# Patient Record
Sex: MALE
[De-identification: names, ages, dates, MRNs, and addresses within clinical notes are randomized per-mention and may not be internally consistent; named-entity substitution may affect disease eponyms.]

---

## 2019-09-08 ENCOUNTER — HOSPITAL ENCOUNTER (OUTPATIENT)
Dept: HOSPITAL 88 - ER | Age: 64
Setting detail: OBSERVATION
LOS: 1 days | Discharge: HOME | End: 2019-09-09
Attending: INTERNAL MEDICINE | Admitting: INTERNAL MEDICINE
Payer: COMMERCIAL

## 2019-09-08 VITALS — HEIGHT: 66 IN | BODY MASS INDEX: 24.59 KG/M2 | WEIGHT: 153 LBS

## 2019-09-08 VITALS — SYSTOLIC BLOOD PRESSURE: 111 MMHG | DIASTOLIC BLOOD PRESSURE: 64 MMHG

## 2019-09-08 VITALS — DIASTOLIC BLOOD PRESSURE: 64 MMHG | SYSTOLIC BLOOD PRESSURE: 111 MMHG
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
## 2019-09-08 DIAGNOSIS — T63.441A: ICD-10-CM

## 2019-09-08 DIAGNOSIS — T78.2XXA: Primary | ICD-10-CM

## 2019-09-08 LAB
ALBUMIN SERPL-MCNC: 4.2 G/DL (ref 3.5–5)
ALBUMIN/GLOB SERPL: 1.2 {RATIO} (ref 0.8–2)
ALP SERPL-CCNC: 67 IU/L (ref 40–150)
ALT SERPL-CCNC: 18 IU/L (ref 0–55)
ANION GAP SERPL CALC-SCNC: 13.7 MMOL/L (ref 8–16)
BACTERIA URNS QL MICRO: (no result) /HPF
BASOPHILS # BLD AUTO: 0 10*3/UL (ref 0–0.1)
BASOPHILS NFR BLD AUTO: 0.2 % (ref 0–1)
BILIRUB UR QL: NEGATIVE
BUN SERPL-MCNC: 16 MG/DL (ref 7–26)
BUN/CREAT SERPL: 20 (ref 6–25)
CALCIUM SERPL-MCNC: 10.1 MG/DL (ref 8.4–10.2)
CHLORIDE SERPL-SCNC: 102 MMOL/L (ref 98–107)
CK MB SERPL-MCNC: 0.8 NG/ML (ref 0–5)
CK MB SERPL-MCNC: 1.8 NG/ML (ref 0–5)
CK SERPL-CCNC: 116 IU/L (ref 30–200)
CK SERPL-CCNC: 159 IU/L (ref 30–200)
CLARITY UR: CLEAR
CO2 SERPL-SCNC: 27 MMOL/L (ref 22–29)
COLOR UR: YELLOW
DEPRECATED APTT PLAS QN: 29.1 SECONDS (ref 23.8–35.5)
DEPRECATED INR PLAS: 0.92
DEPRECATED NEUTROPHILS # BLD AUTO: 10 10*3/UL (ref 2.1–6.9)
DEPRECATED RBC URNS MANUAL-ACNC: (no result) /HPF (ref 0–5)
EGFRCR SERPLBLD CKD-EPI 2021: > 60 ML/MIN (ref 60–?)
EOSINOPHIL # BLD AUTO: 0 10*3/UL (ref 0–0.4)
EOSINOPHIL NFR BLD AUTO: 0.2 % (ref 0–6)
EPI CELLS URNS QL MICRO: (no result) /LPF
ERYTHROCYTE [DISTWIDTH] IN CORD BLOOD: 12.8 % (ref 11.7–14.4)
GLOBULIN PLAS-MCNC: 3.4 G/DL (ref 2.3–3.5)
GLUCOSE SERPLBLD-MCNC: 105 MG/DL (ref 74–118)
HCT VFR BLD AUTO: 40.6 % (ref 38.2–49.6)
HGB BLD-MCNC: 14.1 G/DL (ref 14–18)
KETONES UR QL STRIP.AUTO: NEGATIVE
LEUKOCYTE ESTERASE UR QL STRIP.AUTO: NEGATIVE
LYMPHOCYTES # BLD: 1.1 10*3/UL (ref 1–3.2)
LYMPHOCYTES NFR BLD AUTO: 8.8 % (ref 18–39.1)
MCH RBC QN AUTO: 29.7 PG (ref 28–32)
MCHC RBC AUTO-ENTMCNC: 34.7 G/DL (ref 31–35)
MCV RBC AUTO: 85.5 FL (ref 81–99)
MONOCYTES # BLD AUTO: 0.9 10*3/UL (ref 0.2–0.8)
MONOCYTES NFR BLD AUTO: 7.2 % (ref 4.4–11.3)
NEUTS SEG NFR BLD AUTO: 83.1 % (ref 38.7–80)
NITRITE UR QL STRIP.AUTO: NEGATIVE
PLATELET # BLD AUTO: 277 X10E3/UL (ref 140–360)
POTASSIUM SERPL-SCNC: 3.7 MMOL/L (ref 3.5–5.1)
PROT UR QL STRIP.AUTO: (no result)
PROTHROMBIN TIME: 12.8 SECONDS (ref 11.9–14.5)
RBC # BLD AUTO: 4.75 X10E6/UL (ref 4.3–5.7)
SODIUM SERPL-SCNC: 139 MMOL/L (ref 136–145)
SP GR UR STRIP: 1.02 (ref 1.01–1.02)
TSH SERPL DL<=0.005 MIU/L-ACNC: 1.39 UIU/ML (ref 0.35–4.94)
UROBILINOGEN UR STRIP-MCNC: 0.2 MG/DL (ref 0.2–1)
WBC #/AREA URNS HPF: >50 /HPF (ref 0–5)

## 2019-09-08 PROCEDURE — 81001 URINALYSIS AUTO W/SCOPE: CPT

## 2019-09-08 PROCEDURE — 82550 ASSAY OF CK (CPK): CPT

## 2019-09-08 PROCEDURE — 87086 URINE CULTURE/COLONY COUNT: CPT

## 2019-09-08 PROCEDURE — 70450 CT HEAD/BRAIN W/O DYE: CPT

## 2019-09-08 PROCEDURE — 71045 X-RAY EXAM CHEST 1 VIEW: CPT

## 2019-09-08 PROCEDURE — 93005 ELECTROCARDIOGRAM TRACING: CPT

## 2019-09-08 PROCEDURE — 85610 PROTHROMBIN TIME: CPT

## 2019-09-08 PROCEDURE — 72125 CT NECK SPINE W/O DYE: CPT

## 2019-09-08 PROCEDURE — 82553 CREATINE MB FRACTION: CPT

## 2019-09-08 PROCEDURE — 84484 ASSAY OF TROPONIN QUANT: CPT

## 2019-09-08 PROCEDURE — 83880 ASSAY OF NATRIURETIC PEPTIDE: CPT

## 2019-09-08 PROCEDURE — 80061 LIPID PANEL: CPT

## 2019-09-08 PROCEDURE — 85730 THROMBOPLASTIN TIME PARTIAL: CPT

## 2019-09-08 PROCEDURE — 99284 EMERGENCY DEPT VISIT MOD MDM: CPT

## 2019-09-08 PROCEDURE — 36415 COLL VENOUS BLD VENIPUNCTURE: CPT

## 2019-09-08 PROCEDURE — 80053 COMPREHEN METABOLIC PANEL: CPT

## 2019-09-08 PROCEDURE — 90714 TD VACC NO PRESV 7 YRS+ IM: CPT

## 2019-09-08 PROCEDURE — 84443 ASSAY THYROID STIM HORMONE: CPT

## 2019-09-08 PROCEDURE — 85025 COMPLETE CBC W/AUTO DIFF WBC: CPT

## 2019-09-08 RX ADMIN — CEFTRIAXONE SCH MLS/HR: 100 INJECTION, POWDER, FOR SOLUTION INTRAVENOUS at 20:30

## 2019-09-08 RX ADMIN — SODIUM CHLORIDE SCH MLS/HR: 9 INJECTION, SOLUTION INTRAVENOUS at 20:30

## 2019-09-08 NOTE — DIAGNOSTIC IMAGING REPORT
EXAMINATION:  CHEST SINGLE (PORTABLE)    



INDICATION: Fall, dizziness. 



COMPARISON:  None

     

FINDINGS:

TUBES and LINES:  None.



LUNGS:  Lungs are well inflated.  There is no evidence of pneumonia or

pulmonary edema.



PLEURA:  No pleural effusion or pneumothorax. 



HEART AND MEDIASTINUM:  The cardiomediastinal silhouette is unremarkable.    



BONES AND SOFT TISSUES:  No acute osseous abnormality. 



UPPER ABDOMEN: No free air under the diaphragm.    



IMPRESSION: 

No acute radiographic abnormality. 



Signed by: Dr. Violeta Reina MD on 9/8/2019 4:34 PM

## 2019-09-08 NOTE — DIAGNOSTIC IMAGING REPORT
Examination: CT CERVICAL SPINE WO CONTRAST



HISTORY:Neck injury after syncope.

COMPARISON:None.

TECHNIQUE: Multidetector helical axial images were obtained without contrast

from the foramen magnum to T1.  Coronal and sagittal reformatted images were

done. Bone and soft tissue windows were evaluated. 

Dose modulation, iterative reconstruction, and/or weight based adjustment of

the mA/kV was utilized to reduce the radiation dose to as low as reasonably

achievable. 



FINDINGS:  

Alignment:Normal alignment and lordosis.

Vertebrae:  Normal height and density. No acute fracture, infection or

neoplasm.

Disc space heights: Normal height.

Caliber of spinal canal: Developmentally normal.



Posterior fossa and craniocervical junction: Foramen magnum patent. No Chiari 1

malformation.



Soft tissues: No abnormality.



Degenerative changes:

No disc bulge/ herniation or foraminal or canal stenosis.



Visualized lung apices:

No abnormalities.



IMPRESSION:

No acute abnormalities.



Signed by: Dr. Mona Pickens M.D. on 9/8/2019 4:38 PM

## 2019-09-08 NOTE — DIAGNOSTIC IMAGING REPORT
Examination: CT BRAIN WO CONTRAST



History:Syncope with fall and head injury.

Comparison studies:None



Technique:

Axial images were obtained from the skull base to the vertex.

Coronal and sagittal images reconstructed from the axial data.

Dose modulation, iterative reconstruction, and/or weight based adjustment of

the mA/kV was utilized to reduce the radiation dose to as low as reasonably

achievable. 

Intravenous contrast: None



Findings:



Scalp: No abnormalities.

Bones: No fractures, blastic or lytic lesions.



Brain sulci: Appropriate for age.

Ventricles: Normal in size and configuration. No hydrocephalus.



Extra-axial space:

No abnormalities.



Parenchyma: 

No abnormal densities. 

No masses, hemorrhage, or acute or chronic cortical based vascular insults..



Sellar/suprasellar region: No abnormalities.

Craniocervical junction: Patent foramen magnum. No Chiari one malformation.



Impression:

 

No intracranial abnormalities.



Signed by: Dr. Mona Pickens M.D. on 9/8/2019 4:33 PM

## 2019-09-08 NOTE — XMS REPORT
Patient Summary Document

                             Created on: 2019



ARNIE BOLIVAR

External Reference #: [id number]

: 1955

Sex: Male



Demographics







                          Address                   [address] [place]  [address]

 

                          Home Phone                [phone]

 

                          Preferred Language        Unknown

 

                          Marital Status            Unknown

 

                          Ad[employer]ist Affiliation     Unknown

 

                          Race                      Unknown

 

                          Ethnic Group              Unknown





Author







                          [facility]                    [facility]

 

                          [facility]

 

                          Address                   Unknown

 

                          Phone                     Unavailable







Care Team Providers







                    Care Team Member Name    Role                Phone

 

                    SWEET, A LAIRD      Unavailable         Unavailable







Problems

This patient has no known problems.



Allergies, Adverse Reactions, Alerts

This patient has no known allergies or adverse reactions.



Medications

This patient has no known medications.



Results







           Test Description    Test Time    Test Comments    Text Results    Atomic Results    Result

 Comments

 

                CT CERVICAL SPINE WO    2019 16:33:00                                                      

                                                    [place] [address]      Patient Name: ARNIE BOLIVAR                        
          MR #: [id number]                     : 1955                  
                Age/Sex: 63/M  Acct #: [id number]                             
Req #: [id number]  Adm Physician:                                               
      Ordered by: ALEENA VEGAS NP                            Report #: [id number]
[id number]        Location: ER                                      Room/Bed:         
           
___________________________________________________________________________________________________
   Procedure: [id number] CT/CT CERVICAL SPINE WO  Exam Date: 19            
               Exam Time: 1549                                              
REPORT STATUS: Signed    Examination: CT CERVICAL SPINE WO CONTRAST      
HISTORY:Neck injury after syncope.   COMPARISON:None.   TECHNIQUE: Multidetector
helical axial images were obtained without contrast   from the foramen magnum to
T1.  Coronal and sagittal reformatted images were   done. Bone and soft tissue 
windows were evaluated.    Dose modulation, iterative reconstruction, and/or 
weight based adjustment of   the mA/kV was utilized to reduce the radiation dose
to as low as reasonably   achievable.       FINDINGS:     Alignment:Normal 
alignment and lordosis.   Vertebrae:  Normal height and density. No acute 
fracture, infection or   neoplasm.   Disc space heights: Normal height.   
Caliber of spinal canal: Developmentally normal.      Posterior fossa and 
craniocervical junction: Foramen magnum patent. No Chiari 1   malformation.     
Soft tissues: No abnormality.      Degenerative changes:   No disc bulge/ 
herniation or foraminal or canal stenosis.      Visualized lung apices:   No 
abnormalities.      IMPRESSION:   No acute abnormalities.      Signed by: Dr. Mona Patel M.D. on 2019 4:38 PM        Dictated By: MONA PATEL MD  Electronically Signed By: MONA POTTS MD on 19 
1638  Transcribed By: SIDRA on 19 1638       COPY TO:   ALEENA VEGAS NP                                       

 

                CHEST SINGLE (PORTABLE)    2019 16:32:00                                                   

                                                       [place] [address]      Patient Name: ARNIE BOLIVAR              
                    MR #: [id number]                     : 1955        
                          Age/Sex: 63/M  Acct #: [id number]                   
          Req #: [id number]  Adm Physician:                                     
                Ordered by: ALEENA VEGAS NP                            
Report #: [id number]        Location: ER                                      
Room/Bed:                     
___________________________________________________________________________________________________
   Procedure: [id number] DX/CHEST SINGLE (PORTABLE)  Exam Date: 19         
                  Exam Time: 1600                                              
REPORT STATUS: Signed    EXAMINATION:  CHEST SINGLE (PORTABLE)          
INDICATION: Fall, dizziness.       COMPARISON:  None           FINDINGS:   TUBES
and LINES:  None.      LUNGS:  Lungs are well inflated.  There is no evidence of
pneumonia or   pulmonary edema.      PLEURA:  No pleural effusion or 
pneumothorax.       HEART AND MEDIASTINUM:  The cardiomediastinal silhouette is 
unremarkable.          BONES AND SOFT TISSUES:  No acute osseous abnormality.   
   UPPER ABDOMEN: No free air under the diaphragm.          IMPRESSION:    No 
acute radiographic abnormality.       Signed by: Dr. Sanju Rizvi MD on 2019 
4:34 PM        Dictated By: SANJU RIZVI MD  Electronically Signed By: SANJU RIZVI MD on 19  Transcribed By: SIDRA on 19       COPY TO:   
ALEENA VEGAS NP                       

 

                CT BRAIN WO     2019 16:32:00                                                               

                                           [place] [address]    
 Patient Name: ARNIE BOLIVAR                                   MR #:
[id number]                     : 1955                                  
Age/Sex: 63/M  Acct #: [id number]                              Req #: [id number]
[id number]  Adm Physician:                                                      
Ordered by: ALEENA VEGAS NP                            Report #: [id number]  
     Location: ER                                      Room/Bed:                
    
___________________________________________________________________________________________________
   Procedure: [id number] CT/CT BRAIN WO  Exam Date: 19                     
      Exam Time: 1549                                              REPORT 
STATUS: Signed    Examination: CT BRAIN WO CONTRAST      History:Syncope with 
fall and head injury.   Comparison studies:None      Technique:   Axial images 
were obtained from the skull base to the vertex.   Coronal and sagittal images 
reconstructed from the axial data.   Dose modulation, iterative reconstruction, 
and/or weight based adjustment of   the mA/kV was utilized to reduce the 
radiation dose to as low as reasonably   achievable.    Intravenous contrast: 
None      Findings:      Scalp: No abnormalities.   Bones: No fractures, blastic
or lytic lesions.      Brain sulci: Appropriate for age.   Ventricles: Normal in
size and configuration. No hydrocephalus.      Extra-axial space:   No 
abnormalities.      Parenchyma:    No abnormal densities.    No masses, 
hemorrhage, or acute or chronic cortical based vascular insults..      
Sellar/suprasellar region: No abnormalities.   Craniocervical junction: Patent 
foramen magnum. No Chiari one malformation.      Impression:       No 
intracranial abnormalities.      Signed by: Dr. Mona Patel M.D. on 
2019 4:33 PM        Dictated By: MONA POTTS MD  Electronically
Signed By: MONA POTTS MD on 19 1633  Transcribed By: SIDRA
on 19 1633       COPY TO:   ALEENA VEGAS NP

## 2019-09-09 VITALS — SYSTOLIC BLOOD PRESSURE: 96 MMHG | DIASTOLIC BLOOD PRESSURE: 55 MMHG

## 2019-09-09 VITALS — DIASTOLIC BLOOD PRESSURE: 59 MMHG | SYSTOLIC BLOOD PRESSURE: 106 MMHG

## 2019-09-09 VITALS — SYSTOLIC BLOOD PRESSURE: 100 MMHG | DIASTOLIC BLOOD PRESSURE: 58 MMHG

## 2019-09-09 VITALS — DIASTOLIC BLOOD PRESSURE: 60 MMHG | SYSTOLIC BLOOD PRESSURE: 103 MMHG

## 2019-09-09 LAB
ALBUMIN SERPL-MCNC: 3.4 G/DL (ref 3.5–5)
ALBUMIN/GLOB SERPL: 1.1 {RATIO} (ref 0.8–2)
ALP SERPL-CCNC: 67 IU/L (ref 40–150)
ALT SERPL-CCNC: 15 IU/L (ref 0–55)
ANION GAP SERPL CALC-SCNC: 12.8 MMOL/L (ref 8–16)
BASOPHILS # BLD AUTO: 0 10*3/UL (ref 0–0.1)
BASOPHILS NFR BLD AUTO: 0.1 % (ref 0–1)
BUN SERPL-MCNC: 13 MG/DL (ref 7–26)
BUN/CREAT SERPL: 16 (ref 6–25)
CALCIUM SERPL-MCNC: 9.4 MG/DL (ref 8.4–10.2)
CHLORIDE SERPL-SCNC: 105 MMOL/L (ref 98–107)
CHOLEST SERPL-MCNC: 159 MD/DL (ref 0–199)
CHOLEST/HDLC SERPL: 2.9 {RATIO} (ref 3.9–4.7)
CK MB SERPL-MCNC: 1.3 NG/ML (ref 0–5)
CK SERPL-CCNC: 92 IU/L (ref 30–200)
CO2 SERPL-SCNC: 24 MMOL/L (ref 22–29)
DEPRECATED NEUTROPHILS # BLD AUTO: 9.5 10*3/UL (ref 2.1–6.9)
EGFRCR SERPLBLD CKD-EPI 2021: > 60 ML/MIN (ref 60–?)
EOSINOPHIL # BLD AUTO: 0 10*3/UL (ref 0–0.4)
EOSINOPHIL NFR BLD AUTO: 0 % (ref 0–6)
ERYTHROCYTE [DISTWIDTH] IN CORD BLOOD: 12.9 % (ref 11.7–14.4)
GLOBULIN PLAS-MCNC: 3.2 G/DL (ref 2.3–3.5)
GLUCOSE SERPLBLD-MCNC: 200 MG/DL (ref 74–118)
HCT VFR BLD AUTO: 38.2 % (ref 38.2–49.6)
HDLC SERPL-MSCNC: 54 MG/DL (ref 40–60)
HGB BLD-MCNC: 12.7 G/DL (ref 14–18)
LDLC SERPL CALC-MCNC: 88 MG/DL (ref 60–130)
LYMPHOCYTES # BLD: 0.7 10*3/UL (ref 1–3.2)
LYMPHOCYTES NFR BLD AUTO: 6.4 % (ref 18–39.1)
MCH RBC QN AUTO: 29.4 PG (ref 28–32)
MCHC RBC AUTO-ENTMCNC: 33.2 G/DL (ref 31–35)
MCV RBC AUTO: 88.4 FL (ref 81–99)
MONOCYTES # BLD AUTO: 0.2 10*3/UL (ref 0.2–0.8)
MONOCYTES NFR BLD AUTO: 1.5 % (ref 4.4–11.3)
NEUTS SEG NFR BLD AUTO: 91.5 % (ref 38.7–80)
PLATELET # BLD AUTO: 263 X10E3/UL (ref 140–360)
POTASSIUM SERPL-SCNC: 3.8 MMOL/L (ref 3.5–5.1)
RBC # BLD AUTO: 4.32 X10E6/UL (ref 4.3–5.7)
SODIUM SERPL-SCNC: 138 MMOL/L (ref 136–145)
TRIGL SERPL-MCNC: 85 MG/DL (ref 0–149)

## 2019-09-09 RX ADMIN — SODIUM CHLORIDE SCH MLS/HR: 9 INJECTION, SOLUTION INTRAVENOUS at 05:00

## 2019-09-09 RX ADMIN — CEFTRIAXONE SCH MLS/HR: 100 INJECTION, POWDER, FOR SOLUTION INTRAVENOUS at 06:32

## 2019-09-09 NOTE — HISTORY AND PHYSICAL
HISTORY OF PRESENT ILLNESS:  The patient is a 63-year-old, no past medical history, who

was stung by a wasp and he fainted after that.  The patient has stitches on the scalp.

CT of the head, CT of the cervical spine did show a significant abnormality.  The

patient was fully awake, alert with no shortness of breath.  No distress.  Vital signs

are normal.  Blood work is normal.  The patient is going home today. 

 

REVIEW OF SYSTEMS:

CARDIOVASCULAR:  No chest pain or palpitation. 

RESPIRATORY:  No shortness of breath.  No cough. 

GASTROINTESTINAL:  No nausea or vomiting.  No diarrhea. 

GENITOURINARY:  No frequency.  No dysuria.

 

ALLERGIES:  NOT ALLERGIC TO ANYTHING.

 

SOCIAL HISTORY:  He does not smoke.  He does not drink.

 

PAST MEDICAL HISTORY:  He denies having any medical condition.

 

PHYSICAL EXAMINATION:

VITAL SIGNS:  Blood pressure 103/60, temperature 36.5, heart rate 50 per minute,

respiratory rate 18 per minute, oxygen saturation 99%. 

HEART:  Showed regular rhythm.  Normal S1, S2 sound. 

LUNGS:  Clear bilaterally. 

ABDOMEN:  Soft. 

EXTREMITIES:  Show no evidence of cyanosis, edema, or trauma.  He has some mild swelling

in the right hand where he was bitten by the wasp. 

LABORATORY DATA:  On the BMP; sodium 138, potassium 3.8, chloride 105, CO2 of 24, BUN

13, creatinine 0.81, glucose 200.  On CBC; white count 10.3, hemoglobin 12.7, hematocrit

38.2, platelet count 263,000.  PTT 12.8, INR 0.92, PTT 29.1.  AST 16, ALT 15, total

bilirubin 0.4, alkaline phosphatase 67.  Troponin x3 completely normal.  He also has an

EKG, which shows sinus bradycardia.  No evidence of any ST-segment elevation or

depression. 

 

He had a CT of the cervical spine, it showed no evidence of any fractures or stroke.

 

FINAL IMPRESSION:  

1. Anaphylactic reaction to wasp sting.

2. Syncope.

3. Scalp laceration.

 

PLAN OF TREATMENT:  The patient received a course of IV fluids, IV antibiotics, aspirin.

 The patient is going home today.  He is going to follow up with his primary care

physician for stitch removal.  The patient was given a prescription for EpiPen in case

that he had another encounter with a wasp seems to be he might have been allergic to

that and he can return to work on Wednesday. 

 

 

 

 

______________________________

MD LESLY Choudhury/HARMAN

D:  09/09/2019 09:23:44

T:  09/09/2019 16:16:07

Job #:  [id number]

## 2019-09-09 NOTE — NUR
received report from night shift RN, pt laying in bed, awake, alert, no distress noted, in 
stable condition, call light within reach, will continue to monitor

## 2019-09-10 NOTE — DISCHARGE SUMMARY
The patient is a 63 years old male with no past medical history, was stung by a wasp.

He had syncopal episode with scalp laceration.  He underwent stitches in the ER.  He is

doing fine now.  All the workup including CT of the head, CT of the cervical spine,

chest x-ray, blood work all completely normal.  The patient going home today. 

 

PHYSICAL EXAMINATION:

HEART:  Showed regular rhythm.  Normal S1, S2 sound. 

LUNGS:  Clear bilaterally. 

ABDOMEN:  Soft. 

VITAL SIGNS:  Blood pressure 103/60, temperature 96.5, heart rate 50 per minute,

respiratory rate 18 per minute, oxygen saturation 99%. 

 

Blood work has been already documented in the prior history and physical.  The patient

going home today. 

FINAL IMPRESSION:  

1. Anaphylactic reaction to wasp sting.

2. Syncope.

3. Scalp laceration.

 

PLAN OF TREATMENT:  We are going to prescribe EpiPen, to be taken as directed in case

that he had another wasp sting.  The patient is going to return to work on Wednesday.

Follow with primary care physician in a week. 

 

 

 

 

______________________________

MD LESLY Choudhury/HARMAN

D:  09/09/2019 09:25:22

T:  09/10/2019 07:48:34

Job #:  [id number]